# Patient Record
Sex: FEMALE | Race: WHITE
[De-identification: names, ages, dates, MRNs, and addresses within clinical notes are randomized per-mention and may not be internally consistent; named-entity substitution may affect disease eponyms.]

---

## 2018-05-13 ENCOUNTER — HOSPITAL ENCOUNTER (OUTPATIENT)
Dept: HOSPITAL 76 - ED | Age: 23
Discharge: HOME | End: 2018-05-13
Attending: SURGERY
Payer: MEDICAID

## 2018-05-13 VITALS — DIASTOLIC BLOOD PRESSURE: 70 MMHG | SYSTOLIC BLOOD PRESSURE: 112 MMHG

## 2018-05-13 DIAGNOSIS — K35.80: Primary | ICD-10-CM

## 2018-05-13 LAB
ALBUMIN DIAFP-MCNC: 4.6 G/DL (ref 3.2–5.5)
ALBUMIN/GLOB SERPL: 1.4 {RATIO} (ref 1–2.2)
ALP SERPL-CCNC: 36 IU/L (ref 42–121)
ALT SERPL W P-5'-P-CCNC: 20 IU/L (ref 10–60)
ANION GAP SERPL CALCULATED.4IONS-SCNC: 10 MMOL/L (ref 6–13)
AST SERPL W P-5'-P-CCNC: 20 IU/L (ref 10–42)
B-HCG SERPL QL: NEGATIVE
BASOPHILS NFR BLD AUTO: 0 10^3/UL (ref 0–0.1)
BASOPHILS NFR BLD AUTO: 0.3 %
BILIRUB BLD-MCNC: 0.8 MG/DL (ref 0.2–1)
BUN SERPL-MCNC: 8 MG/DL (ref 6–20)
CALCIUM UR-MCNC: 9.4 MG/DL (ref 8.5–10.3)
CHLORIDE SERPL-SCNC: 104 MMOL/L (ref 101–111)
CLARITY UR REFRACT.AUTO: CLEAR
CO2 SERPL-SCNC: 23 MMOL/L (ref 21–32)
CREAT SERPLBLD-SCNC: 0.6 MG/DL (ref 0.4–1)
EOSINOPHIL # BLD AUTO: 0 10^3/UL (ref 0–0.7)
EOSINOPHIL NFR BLD AUTO: 0.5 %
ERYTHROCYTE [DISTWIDTH] IN BLOOD BY AUTOMATED COUNT: 13.6 % (ref 12–15)
GFRSERPLBLD MDRD-ARVRAT: 125 ML/MIN/{1.73_M2} (ref 89–?)
GLOBULIN SER-MCNC: 3.3 G/DL (ref 2.1–4.2)
GLUCOSE SERPL-MCNC: 104 MG/DL (ref 70–100)
GLUCOSE UR QL STRIP.AUTO: NEGATIVE MG/DL
HGB UR QL STRIP: 13.5 G/DL (ref 12–16)
KETONES UR QL STRIP.AUTO: (no result) MG/DL
LIPASE SERPL-CCNC: 18 U/L (ref 22–51)
LYMPHOCYTES # SPEC AUTO: 2.5 10^3/UL (ref 1.5–3.5)
LYMPHOCYTES NFR BLD AUTO: 24 %
MCH RBC QN AUTO: 31.4 PG (ref 27–31)
MCHC RBC AUTO-ENTMCNC: 34.6 G/DL (ref 32–36)
MCV RBC AUTO: 90.8 FL (ref 81–99)
MONOCYTES # BLD AUTO: 0.5 10^3/UL (ref 0–1)
MONOCYTES NFR BLD AUTO: 5 %
NEUTROPHILS # BLD AUTO: 7.4 10^3/UL (ref 1.5–6.6)
NEUTROPHILS # SNV AUTO: 10.5 X10^3/UL (ref 4.8–10.8)
NEUTROPHILS NFR BLD AUTO: 70.2 %
NITRITE UR QL STRIP.AUTO: NEGATIVE
PDW BLD AUTO: 9.1 FL (ref 7.9–10.8)
PH UR STRIP.AUTO: 5.5 PH (ref 5–7.5)
PLATELET # BLD: 159 10^3/UL (ref 130–450)
PROT SPEC-MCNC: 7.9 G/DL (ref 6.7–8.2)
PROT UR STRIP.AUTO-MCNC: NEGATIVE MG/DL
RBC # UR STRIP.AUTO: NEGATIVE /UL
RBC MAR: 4.28 10^6/UL (ref 4.2–5.4)
SODIUM SERPLBLD-SCNC: 137 MMOL/L (ref 135–145)
SP GR UR STRIP.AUTO: >=1.03 (ref 1–1.03)
UROBILINOGEN UR QL STRIP.AUTO: (no result) E.U./DL
UROBILINOGEN UR STRIP.AUTO-MCNC: NEGATIVE MG/DL

## 2018-05-13 PROCEDURE — 80053 COMPREHEN METABOLIC PANEL: CPT

## 2018-05-13 PROCEDURE — 76830 TRANSVAGINAL US NON-OB: CPT

## 2018-05-13 PROCEDURE — 0DTJ4ZZ RESECTION OF APPENDIX, PERCUTANEOUS ENDOSCOPIC APPROACH: ICD-10-PCS | Performed by: SURGERY

## 2018-05-13 PROCEDURE — 74177 CT ABD & PELVIS W/CONTRAST: CPT

## 2018-05-13 PROCEDURE — 36415 COLL VENOUS BLD VENIPUNCTURE: CPT

## 2018-05-13 PROCEDURE — 44970 LAPAROSCOPY APPENDECTOMY: CPT

## 2018-05-13 PROCEDURE — 76705 ECHO EXAM OF ABDOMEN: CPT

## 2018-05-13 PROCEDURE — 99284 EMERGENCY DEPT VISIT MOD MDM: CPT

## 2018-05-13 PROCEDURE — 99283 EMERGENCY DEPT VISIT LOW MDM: CPT

## 2018-05-13 PROCEDURE — 96374 THER/PROPH/DIAG INJ IV PUSH: CPT

## 2018-05-13 PROCEDURE — 96361 HYDRATE IV INFUSION ADD-ON: CPT

## 2018-05-13 PROCEDURE — 93976 VASCULAR STUDY: CPT

## 2018-05-13 PROCEDURE — 81003 URINALYSIS AUTO W/O SCOPE: CPT

## 2018-05-13 PROCEDURE — 81001 URINALYSIS AUTO W/SCOPE: CPT

## 2018-05-13 PROCEDURE — 84703 CHORIONIC GONADOTROPIN ASSAY: CPT

## 2018-05-13 PROCEDURE — 85025 COMPLETE CBC W/AUTO DIFF WBC: CPT

## 2018-05-13 PROCEDURE — 88304 TISSUE EXAM BY PATHOLOGIST: CPT

## 2018-05-13 PROCEDURE — 87086 URINE CULTURE/COLONY COUNT: CPT

## 2018-05-13 PROCEDURE — 96375 TX/PRO/DX INJ NEW DRUG ADDON: CPT

## 2018-05-13 PROCEDURE — 76856 US EXAM PELVIC COMPLETE: CPT

## 2018-05-13 PROCEDURE — 83690 ASSAY OF LIPASE: CPT

## 2018-05-13 NOTE — OPERATIVE REPORT
DATE OF SERVICE: 05/13/2018

Physician: Compa Balderas MD

 

PREOPERATIVE DIAGNOSIS:  Acute appendicitis.

 

POSTOPERATIVE DIAGNOSIS:  Acute appendicitis.

 

PROCEDURE PERFORMED:  Laparoscopic appendectomy.

 

OPERATING SURGEON:  Compa Balderas MD

 

ANESTHESIA:  General.

 

INDICATIONS FOR SURGERY:  Patient is a 22-year-old female who yesterday evening started to have

pain in the lower abdominal area.  Because of the pain, she came into the emergency room to be 

evaluated.  She had a CT scan of the abdomen and pelvis which showed acute appendicitis.

 

FINDINGS AT SURGERY:  Patient had a mildly inflamed appendix that was nonperforated.

 

DESCRIPTION OF THE PROCEDURE:  After informed consent was obtained, the patient was taken to 

the operating room and placed in the supine position.  General anesthesia was administered.  

Patient's abdomen was then prepped and draped in the usual sterile fashion.  Prior to making 

any abdominal incision, the skin was injected with local anesthesia.  An incision was then made

in the center of the umbilicus.  A 5-mm Optiview trocar was inserted through the incision, 

through the fascia, and into the abdominal cavity under direct vision.  The abdomen was then 

insufflated.  Looking inside, no injuries were noted.  A 12-mm port was placed in the left 

lower quadrant and a second 5-mm port was placed in the right lower quadrant under direct 

vision.  Patient had mildly inflamed acute appendicitis being present.  An opening was then 

made in the mesentery of the appendix at its base.  An Endo-EBONIE was then placed across the base

of the appendix, stapling and dividing it.  The appendiceal mesentery was then divided using 

electrocautery.  The appendix was located retroperitoneal, and so care was taken to avoid any 

surrounding structures.  Appendix was then placed in an Endobag and removed through the left 

lower quadrant port.  A gauze was then placed intraabdominally and used to check for any 

bleeding.  No bleeding was noted.  With the gauze then being removed, the abdomen was then 

desufflated with no bleeding noted at the port sites.  The umbilical and left lower quadrant 

incisions were closed.  The fascia was closed using 2-0 Vicryl sutures.  Skin incisions were 

closed using 4-0 Monocryl subcuticular stitch.  Dermabond was then applied.  Patient was then 

awakened, extubated, and taken from the operating room in a stable condition.

 

ESTIMATED BLOOD LOSS:  A milliliter.

 

COMPLICATIONS:  None.

 

CONDITION OF THE PATIENT AT THE END OF THE PROCEDURE:  Stable.

 

SPECIMENS:  Appendix.

 

DRAINS AND PACKS:  None.

 

CLASSIFICATION OF THE WOUND:  Clean, contaminated.

 

 

DD: 05/13/2018 13:30

TD: 05/13/2018 18:37

Job #: [id number]

## 2018-05-13 NOTE — ULTRASOUND PRELIMINARY REPORT
Accession: [id number]

Exam: US ABDOMEN LIMITED

 

IMPRESSION: Appendix not identified. No secondary signs of appendicitis or other abnormality noted.

 

[facility]

 

SITE ID: [id number]

## 2018-05-13 NOTE — ED PHYSICIAN DOCUMENTATION
PD HPI ABD PAIN





<Scot Landeros - Last Filed: 05/13/18 10:48>





- History obtained from


History obtained from: Patient, Family





- History of Present Illness


Timing - onset: Today


Timing - details: Gradual onset, Still present


Quality: Aching, Sharp


Location: RLQ


Worsened by: Eating, Position, Palpation


Associated symptoms: Fever, Nausea.  No: Vomiting, Hematemesis, Diarrhea, 

Constipation, Dysuria, Hematuria


Similar symptoms before: Has not had sx before


Recently seen: Not recently seen





<Jose R Phillips - Last Filed: 05/14/18 00:18>





- Stated complaint


Stated Complaint: ABDOMINAL PAIN





- Chief complaint


Chief Complaint: Abd Pain





- Additional information


Additional information: 





Patient is a 22 year old female with a history of an ovarian cyst who is 

presenting to the emergency department for right lower quadrant pain.  Patient 

states that the pain started this evening when she was at work.  patient states 

that she also had nausea, but no vomiting.  Patient reports that it is worse 

with movement and palpation.   (Jose R Phillips)





Review of Systems


Constitutional: denies: Fever, Chills


Cardiac: denies: Chest pain / pressure


GI: reports: Abdominal Pain, Nausea.  denies: Vomiting, Constipation, Diarrhea


: denies: Dysuria, Frequency, Hesitancy, Hematuria, Discharge


Skin: denies: Rash, Lesions


Musculoskeletal: denies: Back pain





<Jose R Phillips - Last Filed: 05/14/18 00:18>





PD PAST MEDICAL HISTORY





<Scot Landeros - Last Filed: 05/13/18 10:48>





- Past Medical History


Past Medical History: No





- Past Surgical History


Past Surgical History: No





- Social History


Does the pt smoke?: No


Smoking Status: Never smoker


Does the pt drink ETOH?: No


Does the pt have substance abuse?: No





- Immunizations


Immunizations are current?: No





- POLST


Patient has POLST: No





<Jose R Phillips - Last Filed: 05/14/18 00:18>





- Present Medications


Home Medications: 


 Ambulatory Orders











 Medication  Instructions  Recorded  Confirmed


 


Bcp  05/13/18 














- Allergies


Allergies/Adverse Reactions: 


 Allergies











Allergy/AdvReac Type Severity Reaction Status Date / Time


 


No Known Drug Allergies Allergy   Verified 05/13/18 04:51














PD ED PE NORMAL





- Vitals


Vital signs reviewed: Yes





- General


General: Alert and oriented X 3, No acute distress





- HEENT


HEENT: Atraumatic





- Neck


Neck: Supple, no meningeal sign





- Cardiac


Cardiac: RRR





- Respiratory


Respiratory: No respiratory distress





- Abdomen


Abdomen: Soft





- Derm


Derm: Normal color, Warm and dry





- Extremities


Extremities: No deformity





- Neuro


Neuro: Alert and oriented X 3


Eye Opening: Spontaneous





<Jose R Phillips URI - Last Filed: 05/14/18 00:18>





PD ED PE EXPANDED





- Abdomen


Abdomen: Tender to palpation, RLQ, LLQ.  No: Rebound, Guarding





<Jose R Phillips URI - Last Filed: 05/14/18 00:18>





Results





- Rads (name of study)


  ** ultrasound pelvis


Radiology: Prelim report reviewed (Impression: Normal pelvic ultrasound for age 

and menstrual status.  Tiny age-appropriate follicular cysts both ovaries.  No 

solid mass, pathologic cyst, free fluid or other demonstrate a cause for the 

patient's symptoms.), EMP read indepedently, See rad report





  ** CT abd/pel with


Radiology: Prelim report reviewed (Impression: Findings consistent with acute 

appendicitis with moderate ana-appendical edema.  No substantial free fluid, 

free air or abscess.  Remainder of the abdomen pelvis unremarkable.), EMP read 

indepedently, See rad report





<Scot Landeros - Last Filed: 05/13/18 10:48>





- Vitals


Vitals: 


 Vital Signs - 24 hr











  05/13/18 05/13/18 05/13/18





  04:20 06:00 06:29


 


Temperature 37.3 C 37.2 C 


 


Heart Rate 101 H 80 79


 


Respiratory 16 16 20





Rate   


 


Blood Pressure 131/86 H 116/70 120/73


 


Blood Pressure   





[Left Brachial   





artery]   


 


O2 Saturation 97 100 100














  05/13/18 05/13/18 05/13/18





  08:52 12:00 13:23


 


Temperature  36.9 C 


 


Heart Rate 73 78 


 


Respiratory 17 15 





Rate   


 


Blood Pressure 111/62 112/74 


 


Blood Pressure   





[Left Brachial   





artery]   


 


O2 Saturation 100 98 100














  05/13/18 05/13/18 05/13/18





  13:28 13:33 13:38


 


Temperature   


 


Heart Rate   


 


Respiratory   





Rate   


 


Blood Pressure   


 


Blood Pressure   





[Left Brachial   





artery]   


 


O2 Saturation 100 100 100














  05/13/18 05/13/18 05/13/18





  13:43 13:48 13:53


 


Temperature   


 


Heart Rate   


 


Respiratory   





Rate   


 


Blood Pressure   


 


Blood Pressure   





[Left Brachial   





artery]   


 


O2 Saturation 100 100 100














  05/13/18 05/13/18 05/13/18





  13:58 14:00 14:15


 


Temperature  36.5 C 36.5 C


 


Heart Rate  106 H 106 H


 


Respiratory  16 14





Rate   


 


Blood Pressure   


 


Blood Pressure  129/71 128/80





[Left Brachial   





artery]   


 


O2 Saturation 100 100 98














  05/13/18 05/13/18





  14:30 15:07


 


Temperature 36.3 C L 


 


Heart Rate 100 80


 


Respiratory 16 16





Rate  


 


Blood Pressure  


 


Blood Pressure 123/80 112/70





[Left Brachial  





artery]  


 


O2 Saturation 100 97








 Oxygen











O2 Source                      Room air

















- Labs


Labs: 


 Laboratory Tests











  05/13/18 05/13/18 05/13/18





  04:35 04:40 04:40


 


WBC   10.5 


 


RBC   4.28 


 


Hgb   13.5 


 


Hct   38.9 


 


MCV   90.8 


 


MCH   31.4 H 


 


MCHC   34.6 


 


RDW   13.6 


 


Plt Count   159 


 


MPV   9.1 


 


Neut #   7.4 H 


 


Lymph #   2.5 


 


Mono #   0.5 


 


Eos #   0.0 


 


Baso #   0.0 


 


Absolute Nucleated RBC   0.01 


 


Nucleated RBC %   0.0 


 


Sodium    137


 


Potassium    3.4 L


 


Chloride    104


 


Carbon Dioxide    23


 


Anion Gap    10.0


 


BUN    8


 


Creatinine    0.6


 


Estimated GFR (MDRD)    125


 


Glucose    104 H


 


Calcium    9.4


 


Total Bilirubin    0.8


 


AST    20


 


ALT    20


 


Alkaline Phosphatase    36 L


 


Total Protein    7.9


 


Albumin    4.6


 


Globulin    3.3


 


Albumin/Globulin Ratio    1.4


 


Lipase    18 L


 


Serum HCG, Qual   


 


Urine Color  YELLOW  


 


Urine Clarity  CLEAR  


 


Urine pH  5.5  


 


Ur Specific Gravity  >=1.030 H  


 


Urine Protein  NEGATIVE  


 


Urine Glucose (UA)  NEGATIVE  


 


Urine Ketones  TRACE  


 


Urine Occult Blood  NEGATIVE  


 


Urine Nitrite  NEGATIVE  


 


Urine Bilirubin  NEGATIVE  


 


Urine Urobilinogen  0.2 (NORMAL)  


 


Ur Leukocyte Esterase  NEGATIVE  


 


Ur Microscopic Review  NOT INDICATED  


 


Urine Culture Comments  NOT INDICATED  














  05/13/18





  04:40


 


WBC 


 


RBC 


 


Hgb 


 


Hct 


 


MCV 


 


MCH 


 


MCHC 


 


RDW 


 


Plt Count 


 


MPV 


 


Neut # 


 


Lymph # 


 


Mono # 


 


Eos # 


 


Baso # 


 


Absolute Nucleated RBC 


 


Nucleated RBC % 


 


Sodium 


 


Potassium 


 


Chloride 


 


Carbon Dioxide 


 


Anion Gap 


 


BUN 


 


Creatinine 


 


Estimated GFR (MDRD) 


 


Glucose 


 


Calcium 


 


Total Bilirubin 


 


AST 


 


ALT 


 


Alkaline Phosphatase 


 


Total Protein 


 


Albumin 


 


Globulin 


 


Albumin/Globulin Ratio 


 


Lipase 


 


Serum HCG, Qual  NEGATIVE


 


Urine Color 


 


Urine Clarity 


 


Urine pH 


 


Ur Specific Gravity 


 


Urine Protein 


 


Urine Glucose (UA) 


 


Urine Ketones 


 


Urine Occult Blood 


 


Urine Nitrite 


 


Urine Bilirubin 


 


Urine Urobilinogen 


 


Ur Leukocyte Esterase 


 


Ur Microscopic Review 


 


Urine Culture Comments 














PD MEDICAL DECISION MAKING





<Scot Landeros - Last Filed: 05/13/18 10:48>





- ED course


Complexity details: reviewed old records, reviewed results, re-evaluated patient

, considered differential, d/w patient





<Jose R Phillips - Last Filed: 05/14/18 00:18>





- ED course


ED course: 


22-year-old female whose care is turned over to me at shift change from Dr. Phillips has right lower quadrant abdominal pain that has progressed since 

last night.  On my examination the patient has persistent right lower quadrant 

tenderness with some guarding and some referred tenderness to the right lower 

quadrant I did take the bedside ultrasound exam in the gallbladder is she had 

some tenderness in the right upper quadrant as well this revealed a normal-

appearing gallbladder which did not appear to be sonographically tender.  The 

patient is thin and a CT scan of the abdomen pelvis with oral contrast is 

obtained which demonstrates acute appendicitis.


 (Scot Landeros)





Patient was seen and examined at bedside. urine was collected.  IV access was 

gained and labs were drawn.  patient was not pregnant nor did she have a 

leukocytosis.  due to the history of ovarian cyst ultrasound was ordered.  

Patient was treated with toradol for pain. Patient was signed over to Dr. Landeros pending imaging, re-evaluation and disposition.    (Jose R Phillips)





Departure





<Scot Landeros - Last Filed: 05/13/18 10:48>





<Jose R Phillips - Last Filed: 05/14/18 00:18>





- Departure


Disposition: ED Transfer to [facility]


Clinical Impression: 


Appendicitis


Qualifiers:


 Appendicitis type: acute appendicitis Acute appendicitis type: with localized 

peritonitis Qualified Code(s): K35.3 - Acute appendicitis with localized 

peritonitis





Discharge Date/Time: 05/13/18 12:05

## 2018-05-13 NOTE — ULTRASOUND REPORT
EXAM:  

LIMITED ABDOMINAL ULTRASOUND, RIGHT LOWER QUADRANT, FOR APPENDIX 

 

EXAM DATE: 5/13/2018 07:04 AM.

 

CLINICAL HISTORY: Right lower quadrant pain, nausea.

 

COMPARISON: None.

 

TECHNIQUE: Real-time scanning was performed of the right lower quadrant with static images obtained o
n an emergent basis. 

 

FINDINGS: 

 

APPENDIX:Appendix not identified.

 

COMPRESSION TOLERATED: Marked.

 

ASSOCIATED FINDINGS:

Lymph Nodes Seen: No

 

Free Fluid/Complex Fluid Seen: No

 

Thickened Bowel Wall Seen: No

 

Other: None.

 

IMPRESSION: Appendix not identified. No secondary signs of appendicitis or other abnormality noted.

 

[facility]

Referring Provider Line: [phone]

 

SITE ID: [id number]

## 2018-05-13 NOTE — ULTRASOUND REPORT
EXAM:

PELVIC ULTRASOUND WITH TRANSVAGINAL SCANNING

 

EXAM DATE: 5/13/2018 07:13 AM.

 

CLINICAL HISTORY: Right lower quadrant pain in a 22-year-old female with history of ovarian cysts.

 

COMPARISON: None.

 

TECHNIQUE: Realtime transabdominal pelvic scan performed to identify the uterus and adnexa and as an 
overview of other pelvic structures, followed by transvaginal scan to provide greater detail of the u
terus and adnexa, with static image documentation.

 

FINDINGS: Transabdominal scanning technically limited due to lack of bladder content.

 

Uterus: 9.1 x 3.3 x 5.0 cm, volume 78 cc. Anteverted position. Normal overall size and echotexture. 

Masses: None.

Endometrium: 1.8 mm. Normal.

Cervix: Unremarkable.

 

Right Ovary: 2.0 x 0.9 x 1.3 cm, volume 1.2 cc. Normal echotexture and blood flow. A few tiny age-jamey
ropriate follicles.

Left Ovary: 1.6 x 0.84 x 1.2 cm, volume 0.84 cc. Normal echotexture and blood flow. A few tiny age-ap
propriate follicles.

 

Free Fluid: None.

 

Other: None.

 

IMPRESSION: Normal pelvic ultrasound for age and menstrual status. Tiny age-appropriate follicle cyst
s both ovaries. No solid mass, pathologic cyst, free fluid or other demonstrated cause for the patien
t's symptoms.

 

[facility]

Referring Provider Line: [phone]

 

SITE ID: [id number]

## 2018-05-13 NOTE — CT PRELIMINARY REPORT
Accession: [id number]

Exam: CT ABDOMEN/PELVIS W/

 

IMPRESSION: Findings consistent with acute appendicitis with moderate periappendiceal edema. No subst
antial free fluid, free air or abscess.

 

Remainder of the abdomen and pelvis unremarkable.

 

CRITICAL RESULTS discussed directly with attending emergency physician Scot Landeros M.D. by this ra
diologist at 1032 hrs.

 

[facility]

 

The above findings  were discussed with Scot Landeros by Dr. Jose Alejandro Luna at 10:33 hrs on 5/13/18.

 

SITE ID: [id number]

## 2018-05-13 NOTE — CT REPORT
EXAM:

CT ABDOMEN AND PELVIS WITH CONTRAST

 

EXAM DATE: 5/13/2018 09:47 AM.

 

CLINICAL HISTORY: RLQ (right lower quadrant) pain and tenderness in a 22-year-old female.

 

COMPARISONS: Right lower quadrant abdominal ultrasound for appendix and pelvic ultrasound performed o
n the same patient earlier today.

 

TECHNIQUE: Emergent helical CT imaging was performed through the abdomen and pelvis. IV contrast: 100
 mL Isovue-300. Enteric contrast: Yes. Reconstructions: Coronal and sagittal.

 

In accordance with CT protocol optimization, one or more of the following dose reduction techniques w
ere utilized for this exam: automated exposure control, adjustment of mA and/or KV based on patient s
ize, or use of iterative reconstructive technique.

 

FINDINGS: 

Lung Bases: Unremarkable.

 

Liver: Normal. No masses.

 

Gallbladder/Bile Ducts: No gallstones, wall thickening or dilated bile ducts.

 

Spleen: Normal.

 

Pancreas: Normal.

 

Adrenal Glands: Normal.

 

Kidneys: Normal. No masses, nephrolithiasis, hydroureter or hydronephrosis.

 

Peritoneal Cavity/Bowel: Edematous retrocecal appendix right lower quadrant with moderate periappendi
ceal edema. No free fluid, free air or abscess. Remainder of the bowel unremarkable. No adenopathy.

 

Pelvic Organs: Normal. The bladder and visualized pelvic organs are within normal limits.

 

Vasculature: No aneurysms or other significant abnormality.

 

Bones: Normal.

 

Other: None.

 

IMPRESSION: Findings consistent with acute appendicitis with moderate periappendiceal edema. No subst
antial free fluid, free air or abscess.

 

Remainder of the abdomen and pelvis unremarkable.

 

CRITICAL RESULTS discussed directly with attending emergency physician, Scot Landeros MD, by this ra
diologist at 1032 hrs.

 

[facility]

Referring Provider Line: [phone]

 

SITE ID: [id number]

## 2018-05-13 NOTE — ULTRASOUND PRELIMINARY REPORT
Accession: [id number]

Exam: US PELVIC W/TRANSVAG+DOPPLER LTD

 

IMPRESSION: Normal pelvic ultrasound for age and menstrual status. Tiny age-appropriate follicle cyst
s both ovaries. No solid mass, pathologic cyst, free fluid or other demonstrated cause for the patien
t's symptoms.

 

[facility]

 

SITE ID: [id number]

## 2018-10-05 ENCOUNTER — HOSPITAL ENCOUNTER (OUTPATIENT)
Dept: HOSPITAL 76 - LAB.R | Age: 23
Discharge: HOME | End: 2018-10-05
Attending: NURSE PRACTITIONER
Payer: COMMERCIAL

## 2018-10-05 DIAGNOSIS — N90.7: Primary | ICD-10-CM

## 2018-10-05 DIAGNOSIS — N89.8: ICD-10-CM

## 2018-10-05 PROCEDURE — 87591 N.GONORRHOEAE DNA AMP PROB: CPT

## 2018-10-05 PROCEDURE — 87491 CHLMYD TRACH DNA AMP PROBE: CPT

## 2018-10-05 PROCEDURE — 81599 UNLISTED MAAA: CPT

## 2018-10-05 PROCEDURE — 87389 HIV-1 AG W/HIV-1&-2 AB AG IA: CPT

## 2018-10-05 PROCEDURE — 86696 HERPES SIMPLEX TYPE 2 TEST: CPT

## 2018-10-05 PROCEDURE — 86695 HERPES SIMPLEX TYPE 1 TEST: CPT

## 2018-10-05 PROCEDURE — 86803 HEPATITIS C AB TEST: CPT

## 2018-10-08 ENCOUNTER — HOSPITAL ENCOUNTER (OUTPATIENT)
Dept: HOSPITAL 76 - LAB.N | Age: 23
Discharge: HOME | End: 2018-10-08
Attending: NURSE PRACTITIONER
Payer: COMMERCIAL

## 2018-10-08 DIAGNOSIS — N89.8: ICD-10-CM

## 2018-10-08 DIAGNOSIS — N90.7: Primary | ICD-10-CM

## 2018-10-08 PROCEDURE — 86592 SYPHILIS TEST NON-TREP QUAL: CPT

## 2018-10-08 PROCEDURE — 87591 N.GONORRHOEAE DNA AMP PROB: CPT

## 2018-10-08 PROCEDURE — 81599 UNLISTED MAAA: CPT

## 2018-10-08 PROCEDURE — 86695 HERPES SIMPLEX TYPE 1 TEST: CPT

## 2018-10-08 PROCEDURE — 36415 COLL VENOUS BLD VENIPUNCTURE: CPT

## 2018-10-08 PROCEDURE — 87491 CHLMYD TRACH DNA AMP PROBE: CPT

## 2018-10-08 PROCEDURE — 86696 HERPES SIMPLEX TYPE 2 TEST: CPT

## 2018-10-08 PROCEDURE — 86803 HEPATITIS C AB TEST: CPT

## 2018-10-08 PROCEDURE — 87389 HIV-1 AG W/HIV-1&-2 AB AG IA: CPT

## 2018-10-09 LAB
HEPATITIS C ANTIBODY: (no result)
HIV AG/AB 4TH GEN: (no result)
SIGNAL TO CUT-OFF: 0 (ref ?–1)

## 2018-10-10 LAB
HSV 1 IGG TYPE SPECIFIC AB: <0.9 INDEX
HSV 2 IGG TYPE SPECIFIC AB: <0.9 INDEX

## 2018-11-20 ENCOUNTER — HOSPITAL ENCOUNTER (OUTPATIENT)
Dept: HOSPITAL 76 - LAB.N | Age: 23
Discharge: HOME | End: 2018-11-20
Attending: NURSE PRACTITIONER
Payer: COMMERCIAL

## 2018-11-20 DIAGNOSIS — Z20.2: Primary | ICD-10-CM

## 2018-11-20 PROCEDURE — 86695 HERPES SIMPLEX TYPE 1 TEST: CPT

## 2018-11-20 PROCEDURE — 87389 HIV-1 AG W/HIV-1&-2 AB AG IA: CPT

## 2018-11-20 PROCEDURE — 36415 COLL VENOUS BLD VENIPUNCTURE: CPT

## 2018-11-20 PROCEDURE — 86592 SYPHILIS TEST NON-TREP QUAL: CPT

## 2018-11-20 PROCEDURE — 87591 N.GONORRHOEAE DNA AMP PROB: CPT

## 2018-11-20 PROCEDURE — 81599 UNLISTED MAAA: CPT

## 2018-11-20 PROCEDURE — 86696 HERPES SIMPLEX TYPE 2 TEST: CPT

## 2018-11-20 PROCEDURE — 87491 CHLMYD TRACH DNA AMP PROBE: CPT

## 2018-11-20 PROCEDURE — 86803 HEPATITIS C AB TEST: CPT

## 2018-11-21 LAB
HEPATITIS C ANTIBODY: (no result)
HIV AG/AB 4TH GEN: (no result)
SIGNAL TO CUT-OFF: 0 (ref ?–1)

## 2018-11-24 LAB
HSV 1 IGG TYPE SPECIFIC AB: <0.9 INDEX
HSV 2 IGG TYPE SPECIFIC AB: <0.9 INDEX